# Patient Record
Sex: MALE | Race: WHITE
[De-identification: names, ages, dates, MRNs, and addresses within clinical notes are randomized per-mention and may not be internally consistent; named-entity substitution may affect disease eponyms.]

---

## 2018-07-10 ENCOUNTER — HOSPITAL ENCOUNTER (OUTPATIENT)
Dept: HOSPITAL 47 - RADXRMAIN | Age: 48
Discharge: HOME | End: 2018-07-10
Payer: COMMERCIAL

## 2018-07-10 DIAGNOSIS — M54.2: Primary | ICD-10-CM

## 2018-07-10 DIAGNOSIS — R07.0: ICD-10-CM

## 2018-07-10 PROCEDURE — 70360 X-RAY EXAM OF NECK: CPT

## 2018-07-10 NOTE — XR
Soft tissue neck

 

HISTORY: Pain, sensation of foreign body

 

2 views of the neck

 

The airway is patent. Epiglottis shows a normal appearance in profile. Bone mineralization is normal.
 No radiopaque foreign body evident. Lung apices are normal. Rudimentary cervical ribs noted. There a
re facet arthropathy changes.

 

IMPRESSION: No foreign body evident.

## 2024-10-31 ENCOUNTER — HOSPITAL ENCOUNTER (OUTPATIENT)
Dept: HOSPITAL 47 - RADNMMAIN | Age: 54
Discharge: HOME | End: 2024-10-31
Attending: FAMILY MEDICINE
Payer: COMMERCIAL

## 2024-10-31 PROCEDURE — 93017 CV STRESS TEST TRACING ONLY: CPT

## 2024-10-31 NOTE — CA
Exercise Stress Test Report 

 

 Name:    Davon Ojeda 

 

 MRN:    [id number] 

 

 Exam Date: 10/31/2024 10:53 

 

 Exam Location:      [place]  

                     Stress 

 

 Ht (in):     73     Wt (lb):     207    BSA:    2.18 

 

 Ordering Phys:       Ariel Benavides DO 

 

 Referring Phys:      Cheyenne Rueda  

                      [facility] 

 

 Technologist:        Eliu Covington 

 

 Age:    53    Gender:    M 

 

 :    1970 

 Procedure CPT: 

 

 Indications:              Z82.4 FAMILY HX OF CORONARY ARTERY DISEASE 

 

 ICD-10 Codes: 

 

 Patient History:          FAMILY HX OF HEART DISEASE,  

                           HYPERCHOLESTEROLEMIA, 

 

 Medications:              ATORVASTATIN 

 

 Meds past 24 hrs: 

 

 Pretest Chest Pain: 

 

 STRESS TEST      Lm 

 

 Protocol 

 

 

 

 

 Exercise Duration (min:sec):         12:00 

 Max ST Depressions (mm): 

 Angina Score: 

 Romero Score: 

 Resting HR (bpm):      92 

 

 Peak HR (bpm):         172 

 

 Resting BP (mmHg):       134    /   89 

 

 Peak BP (mmHg):       193   /   85 

 

 MPHR:    169     Target HR:      144 

 

 % MPHR:     102 

 METS:  12.1 

 

 Total Dose: 

 Peak Dose: 

 Atropine: 

 Double Product:       34273 

 

 BP Response: 

 

 Stress Termination:       TARGET HR REACHED/MAX EXERTION 

 

 Stress Symptoms: 

 NO SYMPTOMS 

 

 Stress Summary: 

 

 

  ECG ANALYSIS 

 

 Resting ECG:     Normal sinus rhythm normal axis normal intervals 

 

 Stress ECG:      Patient exercised on Lm protocol for 12 minutes  

                  achieving 13 METS 85% of predicted maximal heart  

                  rate without chest pain at peak exercise there was  

                  1 and half millimeter ST segment depression noted  

                  in the inferolateral leads 

 

 CONCLUSIONS 

 Excellent exercise tolerance 

 Abnormal stress test by EKG criteria 

 

 Dr. Antolin Mendez MD 

 (Electronically Signed) 

 Final Date:      2024 12:41

## 2025-03-06 ENCOUNTER — HOSPITAL ENCOUNTER (OUTPATIENT)
Dept: HOSPITAL 47 - 3 N SLEEP | Age: 55
End: 2025-03-06
Payer: COMMERCIAL

## 2025-03-06 VITALS
DIASTOLIC BLOOD PRESSURE: 76 MMHG | HEART RATE: 74 BPM | RESPIRATION RATE: 16 BRPM | SYSTOLIC BLOOD PRESSURE: 125 MMHG | TEMPERATURE: 97.9 F

## 2025-03-06 DIAGNOSIS — E78.5: ICD-10-CM

## 2025-03-06 DIAGNOSIS — G47.33: Primary | ICD-10-CM

## 2025-03-06 DIAGNOSIS — Z98.890: ICD-10-CM

## 2025-03-06 PROCEDURE — 99211 OFF/OP EST MAY X REQ PHY/QHP: CPT

## 2025-03-06 NOTE — P.SLEEP
History of Present Illness


DATE: 3/6/2025





CONSULTATION/NEW PATIENT EVALUATION





HISTORY OF PRESENT ILLNESS/SLEEP-WAKE EVALUATION:   54-year-old gentleman had be

en evaluated in the sleep center for possible obstructive sleep apnea hypopnea 

syndrome.





SLEEP SCHEDULE: Usually sleep schedule 10:30 PM to 5:30 AM on weekdays and from 

11 PM to 6 AM on weekend.





FALLING ASLEEP: No problems with falling asleep.





DURING SLEEP: Patient has loud snoring and witnessed episodes of stop breathing 

during the sleep by his wife.  Patient wakes up from sleep up to 5 times.  

Positive history of choking during the sleep and nocturia.  No history of 

hypnogogical hallucinations, sleep paralysis, or cataplexy.





DURING THE DAY/WAKE STATE: In the morning patient wake up tired.  Leopold 

sleepiness scale is 5.  Usually patient does not take naps.





PAST MEDICAL HISTORY:   Hyperlipidemia.





PAST SURGICAL HISTORY: Right knee arthroscopic surgery for meniscus problems.





MEDICATIONS:   Atorvastatin 10 mg once a day.





SOCIAL HISTORY:   Please see below.





FAMILY HISTORY: Please see below.





REVIEW OF SYSTEMS: Loud snoring, multiple awakenings from sleep. No fevers. No 

double vision. No recent chest pain. No shortness of breath. No abdominal pain. 

No bleeding episodes. No blood in urine. No seizure episodes. 





PHYSICAL EXAMINATION: 


GENERAL: A pleasant patient without any distress. 


VITAL SIGNS: Please see below, weight 208 pounds, BMI 28.8.


HEENT: PERRLA, EOMI. Evaluation of oropharynx showed tongue protrudes midline, 

low position of soft palate Mallampati 34.


NECK: Supple. No JVD. Thyroid is not palpable.   16.5 inches in circumference.


LUNGS: Clear to percussion and to auscultation. Good air exchange. No wheezing 

or rhonchi. 


HEART: S1, S2 regular. No murmurs, gallops or rubs. 


ABDOMEN: Soft and nontender. Bowel sounds are present. No organomegaly 

appreciated. 


EXTREMITIES: No clubbing or cyanosis. 


CNS: Awake, alert, and oriented x3. Cranial nerves 2 to 7 intact. There is no 

fasciculation or atrophy noted. No focal deficits observed. 





ASSESSMENT:


1.  Loud snoring, witnessed episodes of stop breathing during the sleep, low 

position of soft palate Mallampati 34.  Obstructive sleep apnea hypopnea 

syndrome.


2.  Overweight BMI 28.8.


3.  Hyperlipidemia.


4.  Status post right knee meniscus surgery.





PLAN: 


1.   Home sleep apnea test for evaluation of patient's breathing during sleep. 


2.   Following plan after reading sleep study. 


3.   Preferable position during sleep on the side. 


4.   No driving if patient feels any sleepiness. Patient is aware of civil and 

criminal liability for unsafe driving. 


5.                Sleep hygiene with regular sleep time for at least 7.5-8 

hours.


6.   Watching weight. 





Thank you very much for referring this patient for consultation.





Sincerely,











Lalito San MD, PhD, FAASM.


Diplomat of American Board of Sleep Medicine,


Sleep Medicine Board by American Board of Medical Specialities


American Board of Internal Medicine


Medical Director of [facility]











cc: Ariel Benavides DO





Past Medical History


Past Medical History: Atrial Fibrillation, Hyperlipidemia


Additional Past Medical History / Comment(s): On monitor for possible Afib


History of Any Multi-Drug Resistant Organisms: None Reported


Past Surgical History: Orthopedic Surgery


Additional Past Surgical History / Comment(s): Rt Knee Surgery


Past Anesthesia/Blood Transfusion Reactions: No Reported Reaction


Past Psychological History: No Psychological Hx Reported


Smoking Status: Never smoker


Past Alcohol Use History: Rare


Past Drug Use History: None Reported





- Past Family History


  ** Mother


Family Medical History: Cancer, Hyperlipidemia, Osteoarthritis (OA)


Additional Family Medical History / Comment(s): Skin Cancer





  ** Father


Family Medical History: Coronary Artery Disease (CAD), Hyperlipidemia, 

Osteoarthritis (OA)





Medications and Allergies


                                Home Medications











 Medication  Instructions  Recorded  Confirmed  Type


 


Atorvastatin [Lipitor] 10 mg PO DAILY 03/06/25 03/06/25 History











Physical Exam


Vitals: 


                                   Vital Signs











  Temp Pulse Resp BP Pulse Ox


 


 03/06/25 16:27  97.9 F  74  16  125/76  97








                                Intake and Output











 03/06/25 03/06/25 03/06/25





 06:59 14:59 22:59


 


Other:   


 


  Weight   94.347 kg














Sleep Note





- Sleep Data


ESS Total: 5





- Sleep Note


Sleep Note: 


Temperature: 97.9 F


Pulse Rate: 74


Respiratory Rate: 16


Blood Pressure: 125/76


SpO2: 97


Height: 5 ft 11.2 in


Weight: 94.347 kg


BMI: 


Neck Circumference: 16.5

## 2025-04-01 ENCOUNTER — HOSPITAL ENCOUNTER (OUTPATIENT)
Dept: HOSPITAL 47 - 3 N SLEEP | Age: 55
End: 2025-04-01
Payer: COMMERCIAL

## 2025-04-01 DIAGNOSIS — G47.33: Primary | ICD-10-CM

## 2025-04-01 DIAGNOSIS — G47.31: ICD-10-CM

## 2025-06-24 ENCOUNTER — HOSPITAL ENCOUNTER (OUTPATIENT)
Dept: HOSPITAL 47 - CATHEP | Age: 55
LOS: 1 days | Discharge: HOME | End: 2025-06-25
Attending: INTERNAL MEDICINE
Payer: COMMERCIAL

## 2025-06-24 VITALS — RESPIRATION RATE: 16 BRPM

## 2025-06-24 VITALS — BODY MASS INDEX: 27 KG/M2

## 2025-06-24 DIAGNOSIS — Z99.89: ICD-10-CM

## 2025-06-24 DIAGNOSIS — Z79.899: ICD-10-CM

## 2025-06-24 DIAGNOSIS — Z79.82: ICD-10-CM

## 2025-06-24 DIAGNOSIS — I47.19: Primary | ICD-10-CM

## 2025-06-24 DIAGNOSIS — R73.03: ICD-10-CM

## 2025-06-24 DIAGNOSIS — E78.5: ICD-10-CM

## 2025-06-24 DIAGNOSIS — G47.30: ICD-10-CM

## 2025-06-24 DIAGNOSIS — Z82.49: ICD-10-CM

## 2025-06-24 PROCEDURE — 84443 ASSAY THYROID STIM HORMONE: CPT

## 2025-06-24 PROCEDURE — 86850 RBC ANTIBODY SCREEN: CPT

## 2025-06-24 PROCEDURE — 93623 PRGRMD STIMJ&PACG IV RX NFS: CPT

## 2025-06-24 PROCEDURE — 86900 BLOOD TYPING SEROLOGIC ABO: CPT

## 2025-06-24 PROCEDURE — 93462 L HRT CATH TRNSPTL PUNCTURE: CPT

## 2025-06-24 PROCEDURE — 93662 INTRACARDIAC ECG (ICE): CPT

## 2025-06-24 PROCEDURE — 86901 BLOOD TYPING SEROLOGIC RH(D): CPT

## 2025-06-24 PROCEDURE — 93653 COMPRE EP EVAL TX SVT: CPT

## 2025-06-24 RX ADMIN — ACETAMINOPHEN ONE MLS/HR: 10 INJECTION, SOLUTION INTRAVENOUS at 16:32

## 2025-06-24 RX ADMIN — HEPARIN SODIUM ONE MLS: 10000 INJECTION, SOLUTION INTRAVENOUS at 14:00

## 2025-06-24 RX ADMIN — CEFAZOLIN ONE MLS: 330 INJECTION, POWDER, FOR SOLUTION INTRAMUSCULAR; INTRAVENOUS at 14:30

## 2025-06-24 RX ADMIN — LIDOCAINE HYDROCHLORIDE ONE ML: 10 INJECTION, SOLUTION INFILTRATION; PERINEURAL at 12:15

## 2025-06-24 RX ADMIN — CEFAZOLIN SCH MLS/HR: 330 INJECTION, POWDER, FOR SOLUTION INTRAMUSCULAR; INTRAVENOUS at 10:13

## 2025-06-24 RX ADMIN — POTASSIUM CHLORIDE ONE MLS: 14.9 INJECTION, SOLUTION INTRAVENOUS at 10:15

## 2025-06-24 RX ADMIN — LIDOCAINE HYDROCHLORIDE ONE ML: 10 INJECTION, SOLUTION INFILTRATION; PERINEURAL at 13:00

## 2025-06-24 RX ADMIN — POTASSIUM CHLORIDE ONE MLS: 14.9 INJECTION, SOLUTION INTRAVENOUS at 13:30

## 2025-06-25 VITALS — DIASTOLIC BLOOD PRESSURE: 70 MMHG | TEMPERATURE: 98.1 F | HEART RATE: 72 BPM | SYSTOLIC BLOOD PRESSURE: 116 MMHG

## 2025-06-25 RX ADMIN — ATORVASTATIN CALCIUM SCH MG: 10 TABLET, FILM COATED ORAL at 08:33

## 2025-06-25 RX ADMIN — ASPIRIN 81 MG CHEWABLE TABLET SCH MG: 81 TABLET CHEWABLE at 08:33
